# Patient Record
Sex: FEMALE | Race: BLACK OR AFRICAN AMERICAN | NOT HISPANIC OR LATINO | Employment: FULL TIME | URBAN - METROPOLITAN AREA
[De-identification: names, ages, dates, MRNs, and addresses within clinical notes are randomized per-mention and may not be internally consistent; named-entity substitution may affect disease eponyms.]

---

## 2017-03-14 ENCOUNTER — HOSPITAL ENCOUNTER (EMERGENCY)
Facility: HOSPITAL | Age: 37
Discharge: HOME | End: 2017-03-15
Attending: EMERGENCY MEDICINE
Payer: MEDICAID

## 2017-03-14 VITALS
HEART RATE: 68 BPM | OXYGEN SATURATION: 99 % | HEIGHT: 63 IN | SYSTOLIC BLOOD PRESSURE: 146 MMHG | BODY MASS INDEX: 33.49 KG/M2 | TEMPERATURE: 98 F | DIASTOLIC BLOOD PRESSURE: 78 MMHG | RESPIRATION RATE: 18 BRPM | WEIGHT: 189 LBS
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
## 2017-03-14 DIAGNOSIS — S82.892A ANKLE FRACTURE, LEFT, CLOSED, INITIAL ENCOUNTER: Primary | ICD-10-CM

## 2017-03-14 DIAGNOSIS — T14.8XXA FRACTURE: ICD-10-CM

## 2017-03-14 PROCEDURE — 99283 EMERGENCY DEPT VISIT LOW MDM: CPT | Mod: 25

## 2017-03-14 PROCEDURE — 96372 THER/PROPH/DIAG INJ SC/IM: CPT

## 2017-03-14 PROCEDURE — 25000003 PHARM REV CODE 250: Performed by: EMERGENCY MEDICINE

## 2017-03-14 PROCEDURE — 27840 TREAT ANKLE DISLOCATION: CPT

## 2017-03-14 PROCEDURE — 63600175 PHARM REV CODE 636 W HCPCS: Performed by: EMERGENCY MEDICINE

## 2017-03-14 RX ORDER — HYDROCODONE BITARTRATE AND ACETAMINOPHEN 5; 325 MG/1; MG/1
1 TABLET ORAL EVERY 6 HOURS PRN
Qty: 20 TABLET | Refills: 0 | Status: SHIPPED | OUTPATIENT
Start: 2017-03-14 | End: 2018-04-17

## 2017-03-14 RX ORDER — MIDAZOLAM HYDROCHLORIDE 1 MG/ML
2 INJECTION, SOLUTION INTRAMUSCULAR; INTRAVENOUS
Status: COMPLETED | OUTPATIENT
Start: 2017-03-14 | End: 2017-03-14

## 2017-03-14 RX ORDER — HYDROCODONE BITARTRATE AND ACETAMINOPHEN 10; 325 MG/1; MG/1
1 TABLET ORAL
Status: COMPLETED | OUTPATIENT
Start: 2017-03-14 | End: 2017-03-14

## 2017-03-14 RX ORDER — PAROXETINE HYDROCHLORIDE 20 MG/1
20 TABLET, FILM COATED ORAL EVERY MORNING
COMMUNITY
End: 2018-04-17

## 2017-03-14 RX ORDER — MORPHINE SULFATE 10 MG/ML
10 INJECTION INTRAMUSCULAR; INTRAVENOUS; SUBCUTANEOUS
Status: COMPLETED | OUTPATIENT
Start: 2017-03-14 | End: 2017-03-14

## 2017-03-14 RX ADMIN — MIDAZOLAM HYDROCHLORIDE 2 MG: 1 INJECTION, SOLUTION INTRAMUSCULAR; INTRAVENOUS at 10:03

## 2017-03-14 RX ADMIN — HYDROCODONE BITARTRATE AND ACETAMINOPHEN 1 TABLET: 10; 325 TABLET ORAL at 08:03

## 2017-03-14 RX ADMIN — MORPHINE SULFATE 10 MG: 10 INJECTION INTRAVENOUS at 10:03

## 2017-03-15 NOTE — ED NOTES
Patient given d/c instructions and scripts, stated understanding.  Provided with crutches and given crutch training, able to ambulate with crutches.  Patient ambulated out of room, ride at bedside to take patient home.

## 2017-03-15 NOTE — ED NOTES
Patient resting quietly, NAD noted, respirations even/unlabored.  Patient aware of plan of care and all questions answered, will continue to closely monitor.

## 2017-03-15 NOTE — DISCHARGE INSTRUCTIONS
Ankle Fracture    You have an ankle fracture. This means that one or more of the bones that make up the ankle joint are broken. This causes pain, swelling, and sometimes bruising.  A fracture is treated with a splint or cast or special boot. It will take about 4 to 6 weeks for the fracture to heal. Surgery may be needed to fix severe injuries.  Home care  · You will be given a splint, cast or boot to prevent movement at the ankle joint. Unless you were told otherwise, use crutches or a walker. Dont weight on the injured leg until cleared by your healthcare provider to do so. Crutches and walkers can be rented at many pharmacies and surgical or orthopedic supply stores. Dont put weight on a splint. It will break.  · Keep your leg elevated to reduce pain and swelling. When sleeping, place a pillow under the injured leg. When sitting, support the injured leg so it is level with your waist. This is very important during the first 48 hours.  · Apply an ice pack over the injured area for no more than 15 to 20 minutes. Do this every 3 to 6 hours for the first 24 to 48 hours. Continue with ice packs 3 to 4 times a day for the next 2 days, then as needed to ease pain and swelling. To make an ice pack, put ice cubes in a plastic bag that seals at the top. Wrap the bag in a clean, thin towel or cloth. Never put ice or an ice pack directly on the skin. You can place the ice pack directly over the cast or splint. As the ice melts, be careful that the cast or splint doesnt get wet.  · Keep the cast, splint, or boot completely dry at all times. Bathe with your cast, splint, or boot out of the water, protected with 2 large plastic bags. Place 1 bag outside of the other. Tape each bag with duct tape at the top end. Water can still leak in. So it's best to keep the cast, splint, or boot away from water. If a boot or fiberglass cast or splint gets wet, dry it with a hair dryer on a cool setting.  · You may use over-the-counter  pain medicine to control pain, unless another pain medicine was prescribed. Talk with your provider beforeusing these medicines if you have chronic liver or kidney disease or ever had a stomach ulcer or GI bleeding.  Follow-up care  Follow up with your healthcare provider in 1 week, or as advised. This is to be sure the bone is healing properly. If you were given a splint, it may be changed to a cast at your follow-up visit.  If X-rays were taken, you will be told of any new findings that may affect your care.  When to seek medical advice  Call your healthcare provider right away if any of these occur:  · The plaster cast or splint becomes wet or soft  · The fiberglass cast or splint stays wet for more than 24 hours  · There is increased tightness, sore areas, or pain under the cast or splint  · Your toes become swollen, cold, blue, numb, or tingly  · The cast becomes loose  · The cast has a bad smell  · The cast develops cracks or breaks   Date Last Reviewed: 11/23/2015 © 2000-2016 [employer]. [address] [place] [address]. All rights reserved. This information is not intended as a substitute for professional medical care. Always follow your healthcare professional's instructions.          Treating Ankle Fractures  Treatment of an ankle fracture may be surgical or non-surgical, depending on where and how badly your ankle has been broken.   Some stable ankle fractures may be treated in a walking boot. These fractures are stable and will heal without additional treatment. You may be able to start walking on your ankle as soon as the pain improves.  Some fractures may require cast treatment.  A cast may be used to hold the broken bone in its proper position for healing. Sometimes the sections of broken bone must first be realigned. This is done by a process known as reduction. The type of reduction is based on how far the bone has moved from its normal position.     Sites of common ankle  fractures    Closed reduction  If you have a clean break with little soft tissue damage, closed reduction will probably be used. Before the procedure, you may be given a light anesthetic to relax your muscles. Then your doctor manually readjusts the position of the broken bone.  Open reduction  If you have an open fracture (bone sticking out through the skin), badly misaligned sections of bone, or severe tissue injury, open reduction is likely. A general anesthetic may be used during the procedure to let you sleep and relax your muscles. Your doctor then makes one or more incisions to realign the bone and repair soft tissue. Screws or plates may be used to hold the bone in place during healing.    Casting the fracture  To make sure the bone is aligned properly, an X-ray is taken. Then the ankle is put in a cast to hold the bone in place during healing. Youll probably have to wear the cast for several weeks. For less severe fractures, a walking boot, brace, or splint may be all thats needed to hold the bone in place during healing.  The road to healing  Once your fracture has been treated, your doctor will tell you how to help it heal. You may be told to limit ankle use or weight-bearing activities, take medicines, and elevate the foot. If you have a cast, remember to keep it dry.   Date Last Reviewed: 9/9/2015  © 4759-0094 [employer]. [address] [place] [address], [place] [address]. All rights reserved. This information is not intended as a substitute for professional medical care. Always follow your healthcare professional's instructions.

## 2017-03-15 NOTE — ED PROVIDER NOTES
Encounter Date: 3/14/2017       History     Chief Complaint   Patient presents with    Ankle Pain     Left ankle pain after falling off of a hoverboard.  (+) swelling, (+) distal pulses and cap refill     Review of patient's allergies indicates:  No Known Allergies  HPI Comments: [place] and injured her left ankle.  Complaining of severe pain to the left ankle also state that she can't walk on it    Patient is a 36 y.o. female presenting with the following complaint: leg pain. The history is provided by the patient.   Leg Pain    The incident occurred at home. The injury mechanism was a fall. The incident occurred just prior to arrival. The pain is present in the left ankle. The quality of the pain is described as aching, sharp and throbbing. The pain is at a severity of 8/10. The pain has been constant since onset. Associated symptoms include inability to bear weight. Pertinent negatives include no numbness, no loss of motion, no muscle weakness, no loss of sensation and no tingling. She reports no foreign bodies present. The symptoms are aggravated by bearing weight. She has tried nothing for the symptoms.     Past Medical History:   Diagnosis Date    Depression      History reviewed. No pertinent surgical history.  History reviewed. No pertinent family history.  Social History   Substance Use Topics    Smoking status: Never Smoker    Smokeless tobacco: None    Alcohol use No     Review of Systems   Musculoskeletal: Positive for arthralgias.   Neurological: Negative for tingling and numbness.   All other systems reviewed and are negative.      Physical Exam   Initial Vitals   BP Pulse Resp Temp SpO2   03/14/17 2025 03/14/17 2025 03/14/17 2025 03/14/17 2025 03/14/17 2025   159/93 65 18 97.6 °F (36.4 °C) 99 %     Physical Exam    Nursing note and vitals reviewed.  Constitutional: She appears well-developed and well-nourished.   HENT:   Head:  Normocephalic and atraumatic.   Eyes: EOM are normal.   Neck: Normal range of motion. Neck supple.   Cardiovascular: Normal rate, regular rhythm, normal heart sounds and intact distal pulses.   Pulmonary/Chest: Breath sounds normal.   Abdominal: Soft.   Musculoskeletal:        Left ankle: She exhibits decreased range of motion and swelling. She exhibits no ecchymosis, no deformity, no laceration and normal pulse. Tenderness. Lateral malleolus and medial malleolus tenderness found. Achilles tendon exhibits pain. Achilles tendon exhibits no defect.   Neurological: She is alert and oriented to person, place, and time.   Skin: Skin is warm and dry.   Psychiatric: She has a normal mood and affect. Her behavior is normal. Judgment and thought content normal.         ED Course   Procedures  Labs Reviewed - No data to display       X-Rays:   Independently Interpreted Readings:   Other Readings:  Patient has a fracture of the lateral malleolus, small avulsion of the medial malleolus posteriorly, and a dislocation of the tibial talar joint.  Postreduction films reveal adequate reduction of the displaced tibial talar joint    Medical Decision Making:   Clinical Tests:   Radiological Study: Ordered and Reviewed                   ED Course     Clinical Impression:   The primary encounter diagnosis was Ankle fracture, left, closed, initial encounter. Diagnoses of Fall and Fracture were also pertinent to this visit.    Disposition:   Disposition: Discharged  Condition: Stable       Caron Villalta MD  03/14/17 [id number]

## 2017-06-27 DIAGNOSIS — S82.65XD CLOSED NONDISPLACED FRACTURE OF LATERAL MALLEOLUS OF LEFT FIBULA WITH ROUTINE HEALING: Primary | ICD-10-CM

## 2017-07-19 ENCOUNTER — CLINICAL SUPPORT (OUTPATIENT)
Dept: REHABILITATION | Facility: HOSPITAL | Age: 37
End: 2017-07-19
Attending: ORTHOPAEDIC SURGERY
Payer: MEDICAID

## 2017-07-19 DIAGNOSIS — M79.605 PAIN OF LEFT LOWER EXTREMITY: ICD-10-CM

## 2017-07-19 PROCEDURE — 97110 THERAPEUTIC EXERCISES: CPT

## 2017-07-19 PROCEDURE — 97161 PT EVAL LOW COMPLEX 20 MIN: CPT

## 2017-07-19 NOTE — PROGRESS NOTES
"[facility]   PATIENT EVALUATION    Date: 07/19/2017    Visit #: 1    Start Time:  1105  Stop Time:  1200        History   HPI: Patient reports injuring her L lower leg while stepping off a giraldo board on March 13, 2017. She reports having a fibular fracture fx with a splint and states being on bed rest. Then, the last 5 weeks she has been in a boot. Patient states the MD reported her fx is healed and she has been cleared to wean from boot. Her ankle ROM and standing endurance is most limited.   Onset Date: March 13, 2017  Date of Surgery:April 4, 2017  Primary Diagnosis:   1. Pain of left lower extremity       Treatment Diagnosis: s/p L fibular fx with secondary ankle joint tightness  Past Medical History:   Diagnosis Date    Depression      Precautions: standard  Prior Therapy: none  Diagnostic Tests: x-ray  Prior Level of Function: Independent  Sports/Recreational Activities: working out  Extremity Dominance: right  Functional Deficits Leading to Referral/Nature of Injury: none  Patient Therapy Goals: return to PLOF    Subjective     Kimmie Shmuel states she is feeling fine today.    Pain:  Location: ankles  Description: Sharp and cramping  Activities Which Increase Pain: nothing  Activities Which Decrease Pain: "green acholol"  Pain Scale: 0/10 at best 3/10 now  3/10 at worst    Objective     Posture: increased L knee flexion secondary to boot  Palpation: TTP around incision site  Sensation: intact to light touch  DTRs: NT  Range of Motion/Strength:         Range of motion          Ankle Left Right   DF 4 10   PF 45 70   Inversion 15 20   Eversion 12 22                 Strength:    Knee:        Left Right   Extension      Flexion                     Ankle Left Right   DF 4/5 5/5   PF 2/5 5/5   Inversion 4/5 5/5   Eversion 4/5 5/5             Balance/ Functional Tests   Left Right   SLS eyes open 6 sec 60 sec         Treatment:   Ankle pumps x30  Ankle circles x30 cw and " ccw  Abc's   SL balance eyes open    PT reviewed FOTO scores for Kimmie Mi on 7/19/17  FOTO score: 72    Functional Limitations Reports - G Codes  Category: mobility  Tool: FOTO      Current ():  CJ  Goal (): CI  Discharge ():  NA          History  Co-morbidities and personal factors that may impact the plan of care Low    Stable Clinical Presentation   Co-morbidities:       Personal Factors:     Body regions    Body systems    Activity Limitations    Participation Restrictons   No personal factors and/ or  comorbidites          Address 1-2 elements:     ROM impaired  MMT impaired  Gait impaired  Decreased FOTO score                                 Assessment   This is a 36 y.o. female referred to outpatient physical therapy and presents with a medical diagnosis of s/p L fibular fx and demonstrates limitations as described in the problem list. Pt demonstrates good rehab potential. Pt will benefit from physcial therapy services in order to maximize pain free and/or functional use of left ankle. The following goals were discussed with the patient and patient is in agreement with them as to be addressed in the treatment plan. Pt was given a HEP consisting of treatment this visit. Pt verbally understood the instructions as they were given and demonstrated proper form and technique during therapy. Pt was advised to perform these exercises free of pain, and to stop performing them if pain occurs.         Initial Assessment (Pertinent finding, problem list and factors affecting outcome):   Medical necessity is demonstrated by the following problem list:   - Pain limits function of effected part for all activities  - Unable to participate in daily activities     Rehab Potiential: good    Short Term Goals (4 Weeks):   - Pt will increase ROM to 10 deg L DF and 55 deg L PF  - Pt will increase strength to 4/5 L PF, 5/5 L DF, IN, Ev  - Decrease Pain to 2/10 as worst  - Pt to self correct posture with minimal  cues  - Pt independent with HEP with progressions.     Long Term Goals (8-10 Weeks):   - Pt will increase ROM to WNL L ankle  - Pt will increase strength to 5/5 L ankle  - Decrease Pain to 0/10  - Pt to return to 95% PLOF  - Pt will balance SL with eyes open for 60 secs      Plan     Pt will be seen 1-2 times per week for 8-10 weeks. Recommended Treatment Plan will include:   AROM/PROM exercise  Manual soft tissue and/or joint mobilization  Therapeutic Exercise   Individualized Home Exercise Program  Modalities:cryotherapy   Pt education:on HEP    Therapist: Carmen Ogden, PT, DPT  I CERTIFY THE NEED FOR THESE SERVICES FURNISHED UNDER THIS PLAN OF TREATMENT AND WHILE UNDER MY CARE    Physician's comments: ________________________________________________________________________________________________________________________________________________    Physician's Name: ___________________________________

## 2017-07-26 ENCOUNTER — CLINICAL SUPPORT (OUTPATIENT)
Dept: REHABILITATION | Facility: HOSPITAL | Age: 37
End: 2017-07-26
Attending: ORTHOPAEDIC SURGERY
Payer: MEDICAID

## 2017-07-26 DIAGNOSIS — M79.605 PAIN OF LEFT LOWER EXTREMITY: ICD-10-CM

## 2017-07-26 PROCEDURE — 97110 THERAPEUTIC EXERCISES: CPT

## 2017-07-26 PROCEDURE — 97140 MANUAL THERAPY 1/> REGIONS: CPT

## 2017-07-26 NOTE — PROGRESS NOTES
Physical Therapy Daily Note     Date: 07/26/2017  Name: Kimmie Mi  Clinic Number: [id number]  Diagnosis:   Encounter Diagnosis   Name Primary?    Pain of left lower extremity      Physician: Earnest Cha, *    Evaluation Date: 7/19/17  Visit #: 2   Start Time:  1110  Stop Time:  1205  Total Treatment Time: 55    Precautions: standard, s/p L fib fx      Subjective     Pt reports compliance with HEP.    Pain: 0/10    Objective     Measurements taken:       Patient received group therapy to increase strength, endurance, ROM and flexibility with activities as follows:     Kimmie received therapeutic exercises to develop strength, endurance, ROM and flexibility for 30 minutes including:   Stat bike x10 min  Ankle pumps x30  Ankle circles x30 cw and ccw  Abc's -np  SL balance eyes open  Fitter board 4 ways x30 ea  [place]  x1  PF/DF ytb x30  Towel curls x2 min    Treatment ended with ice x10 min      Kimmie received the following manual therapy techniques: Joint mobilizations were applied to the: L ankle for 10 minutes.       Written Home Exercises Provided: provided at initial eval  Pt demo good understanding of the education provided. Kimmie demonstrated good return demonstration of activities.     Education provided:  Kimmie verbalized good understanding of education provided.   No spiritual or educational barriers to learning identified.    Assessment     This is a 36 y.o. female referred to outpatient physical therapy and presents with a medical diagnosis of s/p L fib fx and demonstrates limitations as described in the problem list Pt prognosis is Good. Pt will continue to benefit from skilled outpatient physical therapy to address the deficits listed below in the problem list, provide pt/family education and to maximize pt's level of independence in the home and community environment.    Will the patient continue to benefit from skilled PT  intervention? yes        Medical necessity is demonstrated by:   - Pain limits function of effected part for all activities  - Unable to participate in daily activities     Progress towards goals: good    New/Revised Goals:none this visit       Plan   Continue with established Plan of Care towards PT goals.      Therapist: Carmen Ogden, PT, DPT

## 2017-07-28 ENCOUNTER — CLINICAL SUPPORT (OUTPATIENT)
Dept: REHABILITATION | Facility: HOSPITAL | Age: 37
End: 2017-07-28
Attending: ORTHOPAEDIC SURGERY
Payer: MEDICAID

## 2017-07-28 DIAGNOSIS — M79.605 PAIN OF LEFT LOWER EXTREMITY: ICD-10-CM

## 2017-07-28 PROCEDURE — 97140 MANUAL THERAPY 1/> REGIONS: CPT

## 2017-07-28 PROCEDURE — 97110 THERAPEUTIC EXERCISES: CPT

## 2017-07-28 NOTE — PROGRESS NOTES
Physical Therapy Daily Note     Date: 07/28/2017  Name: Kimmie Mi  Clinic Number: [id number]  Diagnosis:   Encounter Diagnosis   Name Primary?    Pain of left lower extremity      Physician: Earnest Cha, *    Evaluation Date: 7/19/17  Visit #: 3   Start Time:  355  Stop Time:  445  Total Treatment Time: 50    Precautions: standard, s/p L fib fx      Subjective     Pt reports noticing slight improvement with L ankle ROM.    Pain: 0/10    Objective     Measurements taken:       Patient received group therapy to increase strength, endurance, ROM and flexibility with activities as follows:     Kimmie received therapeutic exercises to develop strength, endurance, ROM and flexibility for 30 minutes including:   Stat bike x10 min  Ankle pumps x30-np  Ankle circles x30 cw and ccw-np  SL balance eyes open-np  Fitter board 4 ways x30 ea  [place]  x1  PF/DF ytb x30  MR iso IN/EV 5 x 10sh  Towel curls x2 min    Treatment ended with ice x10 min      Kimmie received the following manual therapy techniques: Joint mobilizations were applied to the: L ankle for 10 minutes.       Written Home Exercises Provided: provided at initial eval  Pt demo good understanding of the education provided. Kimmie demonstrated good return demonstration of activities.     Education provided:  Kimmie verbalized good understanding of education provided.   No spiritual or educational barriers to learning identified.    Assessment   Patient showing slight improvement with ROM during fitter board exercises and improved technique/coordination with increased repetitions. Patient continues to compensation inversion and eversion with hip rotation, but is responding well to isometric strengthening with tactile cuing. Patient would continue to benefit from PT intervention to progress toward functional goals.   This is a 36 y.o. female referred to outpatient physical therapy and presents with a  medical diagnosis of s/p L fib fx and demonstrates limitations as described in the problem list Pt prognosis is Good. Pt will continue to benefit from skilled outpatient physical therapy to address the deficits listed below in the problem list, provide pt/family education and to maximize pt's level of independence in the home and community environment.    Will the patient continue to benefit from skilled PT intervention? yes        Medical necessity is demonstrated by:   - Pain limits function of effected part for all activities  - Unable to participate in daily activities     Progress towards goals: good    New/Revised Goals:none this visit       Plan   Continue with established Plan of Care towards PT goals.      Therapist: Carmen Ogden, PT, DPT

## 2017-08-04 ENCOUNTER — CLINICAL SUPPORT (OUTPATIENT)
Dept: REHABILITATION | Facility: HOSPITAL | Age: 37
End: 2017-08-04
Attending: ORTHOPAEDIC SURGERY
Payer: MEDICAID

## 2017-08-04 DIAGNOSIS — M79.605 PAIN OF LEFT LOWER EXTREMITY: ICD-10-CM

## 2017-08-04 PROCEDURE — 97140 MANUAL THERAPY 1/> REGIONS: CPT

## 2017-08-04 PROCEDURE — 97110 THERAPEUTIC EXERCISES: CPT

## 2017-08-04 NOTE — PROGRESS NOTES
Physical Therapy Daily Note     Date: 08/04/2017  Name: Kimmie Mi  Clinic Number: [id number]  Diagnosis:   Encounter Diagnosis   Name Primary?    Pain of left lower extremity      Physician: Earnest Cha, *    Evaluation Date: 7/19/17  Visit #: 4  Start Time:  335  Stop Time:  430  Total Treatment Time: 55    Precautions: standard, s/p L fib fx      Subjective     Pt reports she was sore/stiff from wearing her normal shoes today.    Pain: 0/10    Objective     Measurements taken:       Patient received group therapy to increase strength, endurance, ROM and flexibility with activities as follows:     Kimmie received therapeutic exercises to develop strength, endurance, ROM and flexibility for 35 minutes including:   Stat bike x10 min  SL balance eyes open-np  Fitter board 4 ways x30 ea  [place]  x1  PF/DF otb x30  In/Ev ytb x30  MR iso IN/EV 5 x 10sh-np  Towel curls x2 min-np  Toe walking with stick x1 lap  Heel walking x1 lap    Treatment ended with ice x10 min      Kimmie received the following manual therapy techniques: Joint mobilizations and STM were applied to the: L ankle for 10 minutes.       Written Home Exercises Provided: provided at initial eval  Pt demo good understanding of the education provided. Kimmie demonstrated good return demonstration of activities.     Education provided:  Kimmie verbalized good understanding of education provided.   No spiritual or educational barriers to learning identified.    Assessment   Patient showed much improved Inversion and eversion this visit with less compensation. Patient also tolerated toe walking well for first attempt. Patient would continue to benefit from PT intervention to progress toward functional goals.   This is a 36 y.o. female referred to outpatient physical therapy and presents with a medical diagnosis of s/p L fib fx and demonstrates limitations as described in the problem list Pt  prognosis is Good. Pt will continue to benefit from skilled outpatient physical therapy to address the deficits listed below in the problem list, provide pt/family education and to maximize pt's level of independence in the home and community environment.    Will the patient continue to benefit from skilled PT intervention? yes        Medical necessity is demonstrated by:   - Pain limits function of effected part for all activities  - Unable to participate in daily activities     Progress towards goals: good    New/Revised Goals:none this visit       Plan   Continue with established Plan of Care towards PT goals.      Therapist: Carmen Ogden, PT, DPT

## 2017-08-09 ENCOUNTER — CLINICAL SUPPORT (OUTPATIENT)
Dept: REHABILITATION | Facility: HOSPITAL | Age: 37
End: 2017-08-09
Attending: ORTHOPAEDIC SURGERY
Payer: MEDICAID

## 2017-08-09 DIAGNOSIS — M79.605 PAIN OF LEFT LOWER EXTREMITY: Primary | ICD-10-CM

## 2017-08-09 PROCEDURE — 97140 MANUAL THERAPY 1/> REGIONS: CPT

## 2017-08-09 PROCEDURE — 97110 THERAPEUTIC EXERCISES: CPT

## 2017-08-09 NOTE — PROGRESS NOTES
Physical Therapy Daily Note     Date: 08/09/2017  Name: Kimmie Mi  Clinic Number: [id number]  Diagnosis:   Encounter Diagnosis   Name Primary?    Pain of left lower extremity Yes     Physician: Earnest Cha, *    Evaluation Date: 7/19/17  Visit #: 5  Start Time:  11:12  Stop Time:  430  Total Treatment Time: 55    Precautions: standard, s/p L fib fx      Subjective     Pt reports she was sore/stiff from wearing her normal shoes today.    Pain: 0/10    Objective     Measurements taken:       Patient received group therapy to increase strength, endurance, ROM and flexibility with activities as follows:     Kimmie received therapeutic exercises to develop strength, endurance, ROM and flexibility for 35 minutes including:   Stat bike x10 min  SL balance eyes open-np  Fitter board 4 ways x30 ea  [place]  x1  PF/DF otb x30  In/Ev otb x30  MR iso IN/EV 5 x 10sh-np  Towel curls x2 min-np  Toe walking with stick x1 lap  Heel walking x1 lap    Treatment ended with ice x10 min      Kimmie received the following manual therapy techniques: Joint mobilizations and STM were applied to the: L ankle for 10 minutes.       Written Home Exercises Provided: provided at initial eval  Pt demo good understanding of the education provided. Kimmie demonstrated good return demonstration of activities.     Education provided:  Kimmie verbalized good understanding of education provided.   No spiritual or educational barriers to learning identified.    Assessment   Patient continues to show improved Inversion and eversion this visit with less compensation. Patient also tolerated toe walking well for first attempt. Patient would continue to benefit from PT intervention to progress toward functional goals.   This is a 36 y.o. female referred to outpatient physical therapy and presents with a medical diagnosis of s/p L fib fx and demonstrates limitations as described in the problem  list Pt prognosis is Good. Pt will continue to benefit from skilled outpatient physical therapy to address the deficits listed below in the problem list, provide pt/family education and to maximize pt's level of independence in the home and community environment.    Will the patient continue to benefit from skilled PT intervention? yes        Medical necessity is demonstrated by:   - Pain limits function of effected part for all activities  - Unable to participate in daily activities     Progress towards goals: good    New/Revised Goals:none this visit       Plan   Continue with established Plan of Care towards PT goals.      Therapist: Ever Lucero PTA,

## 2017-08-11 ENCOUNTER — CLINICAL SUPPORT (OUTPATIENT)
Dept: REHABILITATION | Facility: HOSPITAL | Age: 37
End: 2017-08-11
Attending: ORTHOPAEDIC SURGERY
Payer: MEDICAID

## 2017-08-11 DIAGNOSIS — M79.605 PAIN OF LEFT LOWER EXTREMITY: ICD-10-CM

## 2017-08-11 PROCEDURE — 97110 THERAPEUTIC EXERCISES: CPT

## 2017-08-11 NOTE — PROGRESS NOTES
Physical Therapy Daily Note     Date: 08/11/2017  Name: Kimmie Mi  Clinic Number: [id number]  Diagnosis:   Encounter Diagnosis   Name Primary?    Pain of left lower extremity      Physician: Earnest Cha, *    Evaluation Date: 7/19/17  Visit #: 6  Start Time:  355  Stop Time:  435  Total Treatment Time: 40    Precautions: standard, s/p L fib fx      Subjective     Pt reports she has been working on her walking and balance.    Pain: 0/10    Objective     Measurements taken:       Patient received group therapy to increase strength, endurance, ROM and flexibility with activities as follows:     Kimmie received therapeutic exercises to develop strength, endurance, ROM and flexibility for 40 minutes including:   Stat bike x10 min  SL balance eyes open-np  Fitter board 4 ways x30 ea  [place]  x1  PF/DF otb x30  In/Ev otb x30  MR iso IN/EV 5 x 10sh-np  Towel curls x2 min-np  Toe walking  x1 lap  Heel walking x1 lap  GSS x2 min      Treatment ended with ice x10 min      Kimmie received the following manual therapy techniques: Joint mobilizations and STM were applied to the: L ankle for 0 minutes.       Written Home Exercises Provided: provided at initial eval  Pt demo good understanding of the education provided. Kimmie demonstrated good return demonstration of activities.     Education provided:  Kimmie verbalized good understanding of education provided.   No spiritual or educational barriers to learning identified.    Assessment   Patient demonstrated improved SL balance, toe walking and In/Ev strength this visit. She is progressing well with PT intervention and is showing good motor return of L ankle. Patient would continue to benefit from PT intervention to progress toward functional goals.   This is a 36 y.o. female referred to outpatient physical therapy and presents with a medical diagnosis of s/p L fib fx and demonstrates limitations as described  in the problem list Pt prognosis is Good. Pt will continue to benefit from skilled outpatient physical therapy to address the deficits listed below in the problem list, provide pt/family education and to maximize pt's level of independence in the home and community environment.    Will the patient continue to benefit from skilled PT intervention? yes        Medical necessity is demonstrated by:   - Pain limits function of effected part for all activities  - Unable to participate in daily activities     Progress towards goals: good    New/Revised Goals:none this visit       Plan   Continue with established Plan of Care towards PT goals.      Therapist: Carmen Ogden, PT, DPT

## 2017-08-16 ENCOUNTER — CLINICAL SUPPORT (OUTPATIENT)
Dept: REHABILITATION | Facility: HOSPITAL | Age: 37
End: 2017-08-16
Attending: ORTHOPAEDIC SURGERY
Payer: MEDICAID

## 2017-08-16 DIAGNOSIS — M79.605 PAIN OF LEFT LOWER EXTREMITY: Primary | ICD-10-CM

## 2017-08-16 PROCEDURE — 97110 THERAPEUTIC EXERCISES: CPT

## 2017-08-16 NOTE — PROGRESS NOTES
Physical Therapy Daily Note     Date: 08/16/2017  Name: Kimmie Mi  Clinic Number: [id number]  Diagnosis:   Encounter Diagnosis   Name Primary?    Pain of left lower extremity Yes     Physician: Earnest Cha, *    Evaluation Date: 7/19/17  Visit #: 7  Start Time:  10:55  Stop Time:  12:10  Total Treatment Time: 60    Precautions: standard, s/p L fib fx      Subjective     Pt reports that she sometimes get pn in proximal anterior ankle. Pt is w/o c/o pn at this time.    Pain: 0/10    Objective     Measurements taken:       Patient was instructed in and performed ther ex  to increase strength, endurance, ROM and flexibility, bal with activities as follows:     Kimmie was instructed in and performed  therapeutic exercises to develop strength, endurance, ROM and flexibility, bal for 50 minutes including:   Stat bike x10 min  SL balance eyes open 3 x 30 sec  Fitter board 4 ways x30 ea  [employer] Catch L 12 5 min   [place]  x1  PF/DF gtb x30  In/Ev gtb x30  MR iso IN/EV 5 x 10sh-np  Towel curls x2 min-np  Toe walking  x1 lap  Heel walking x1 lap  GSS x2 min      Treatment ended with ice x10 min      Kimmie received the following manual therapy techniques: Joint mobilizations and STM were applied to the: L ankle for 0 minutes.       Written Home Exercises Provided: provided at initial eval  Pt demo good understanding of the education provided. Kimmie demonstrated good return demonstration of activities.     Education provided:  Kimmie verbalized good understanding of education provided.   No spiritual or educational barriers to learning identified.    Assessment   Patient demonstrated improved SL balance, toe walking and In/Ev strength this visit. She is progressing well with PT intervention and is showing good motor return of L ankle. Patient would continue to benefit from PT intervention to progress toward functional goals.   This is a 36 y.o. female referred  to outpatient physical therapy and presents with a medical diagnosis of s/p L fib fx and demonstrates limitations as described in the problem list Pt prognosis is Good. Pt will continue to benefit from skilled outpatient physical therapy to address the deficits listed below in the problem list, provide pt/family education and to maximize pt's level of independence in the home and community environment.    Will the patient continue to benefit from skilled PT intervention? yes        Medical necessity is demonstrated by:   - Pain limits function of effected part for all activities  - Unable to participate in daily activities     Progress towards goals: good    New/Revised Goals:none this visit       Plan   Continue with established Plan of Care towards PT goals.      Therapist: Ever Lucero PTA,

## 2018-04-17 ENCOUNTER — HOSPITAL ENCOUNTER (EMERGENCY)
Facility: HOSPITAL | Age: 38
Discharge: HOME | End: 2018-04-17
Attending: EMERGENCY MEDICINE
Payer: MEDICAID

## 2018-04-17 VITALS
TEMPERATURE: 98 F | RESPIRATION RATE: 18 BRPM | WEIGHT: 167 LBS | DIASTOLIC BLOOD PRESSURE: 67 MMHG | HEART RATE: 79 BPM | OXYGEN SATURATION: 95 % | SYSTOLIC BLOOD PRESSURE: 123 MMHG | BODY MASS INDEX: 28.51 KG/M2 | HEIGHT: 64 IN

## 2018-04-17 DIAGNOSIS — L02.31 ABSCESS AND CELLULITIS OF GLUTEAL REGION: Primary | ICD-10-CM

## 2018-04-17 DIAGNOSIS — L03.317 ABSCESS AND CELLULITIS OF GLUTEAL REGION: Primary | ICD-10-CM

## 2018-04-17 PROCEDURE — 99283 EMERGENCY DEPT VISIT LOW MDM: CPT

## 2018-04-17 RX ORDER — CLINDAMYCIN HYDROCHLORIDE 150 MG/1
300 CAPSULE ORAL 4 TIMES DAILY
Qty: 56 CAPSULE | Refills: 0 | Status: SHIPPED | OUTPATIENT
Start: 2018-04-17 | End: 2018-04-24

## 2018-04-17 NOTE — ED NOTES
Abscess to inner left buttock at gluteal fold x 1 week that worsened over the last 2 days.  Denies fever or drainage from site.  Red, swollen and tender to touch.

## 2018-04-17 NOTE — ED PROVIDER NOTES
Encounter Date: 4/17/2018       History     Chief Complaint   Patient presents with    Abscess     c/o abscess to left buttock     37-year-old female presents complaining of abscess to left buttock ×5 days.  Patient states it has continually gotten harder and more painful.  She has been applying warm compresses with witch hazel and taking ibuprofen 800mg for pain with some relief.  States that it has drained a few drops of blood since doing the compresses.  Patient states she has had a similar episode which resulted in cyst removal from her upper back.  Denies fever.       The history is provided by the patient. No  was used.     Review of patient's allergies indicates:  No Known Allergies  Past Medical History:   Diagnosis Date    Depression      Past Surgical History:   Procedure Laterality Date    ANKLE SURGERY Left      No family history on file.  Social History   Substance Use Topics    Smoking status: Never Smoker    Smokeless tobacco: Not on file    Alcohol use No     Review of Systems   Constitutional: Negative for chills and fever.   HENT: Negative for sore throat.    Respiratory: Negative for shortness of breath.    Cardiovascular: Negative for chest pain.   Gastrointestinal: Negative for nausea.   Genitourinary: Negative for dysuria.   Musculoskeletal: Negative for back pain.   Skin: Negative for rash.   Neurological: Negative for weakness.   Hematological: Does not bruise/bleed easily.   Psychiatric/Behavioral: Negative for confusion.       Physical Exam     Initial Vitals [04/17/18 1258]   BP Pulse Resp Temp SpO2   123/67 79 18 97.6 °F (36.4 °C) 95 %      MAP       85.67         Physical Exam    Nursing note and vitals reviewed.  Constitutional: Vital signs are normal. She appears well-developed and well-nourished. No distress.   HENT:   Head: Normocephalic and atraumatic.   Nose: Nose normal.   Eyes: Conjunctivae and lids are normal.   Neck: Normal range of motion and  phonation normal.   Cardiovascular: Normal rate, regular rhythm and normal heart sounds.   Pulmonary/Chest: Effort normal and breath sounds normal.   Abdominal: Soft. Normal appearance and bowel sounds are normal.   Musculoskeletal: Normal range of motion.   Neurological: She is alert and oriented to person, place, and time.   Skin: Skin is warm and dry. Abscess noted.        Psychiatric: She has a normal mood and affect. Her speech is normal and behavior is normal. Judgment and thought content normal. Cognition and memory are normal.         ED Course   Procedures  Labs Reviewed - No data to display          Medical Decision Making:   Initial Assessment:   37-year-old female presents complaining of abscess to left buttock ×5 days.  Patient states it has continually gotten harder and more painful.  She has been applying warm compresses with witch hazel and taking ibuprofen 800mg for pain with some relief.  States that it has drained a few drops of blood since doing the compresses.  Patient states she has had a similar episode which resulted in cyst removal from her upper back.  Denies fever.   Physical exam reveals presently 6 cm of induration with central opening, not draining, TTP.  Positive for erythema and warmth.  Negative for fluctuance.   Incision and drainage is not indicated as no area of fluctuance is found and area is already open.  Differential Diagnosis:   Abscess versus cellulitis  ED Management:  Patient will be discharged with prescription for clindamycin ×7 days.  Advised to continue warm compresses and that wound may drain bloody or purulent material.  Instructed to follow-up with her primary care doctor in 2-3 days.  Advised to return to ED if symptoms worsen or she develops fever.  Patient seen by and discussed with Dr. Shaver who agrees.              Attending Attestation:     Physician Attestation Statement for NP/PA:   I have conducted a face to face encounter with this patient in addition to  the NP/PA, due to NP/PA Request    Other NP/PA Attestation Additions:    History of Present Illness: 37-year-old female who complains of a left buttock abscess.  No fever or chills.   Physical Exam: Left buttock with approximately 6 cm circular area of erythema and induration.  No fluctuance.                     Clinical Impression:   The encounter diagnosis was Abscess and cellulitis of gluteal region.                           ANAND ParryC  04/17/18 8809       Raleigh Shaver MD  04/17/18 2190

## 2019-02-28 ENCOUNTER — HOSPITAL ENCOUNTER (EMERGENCY)
Facility: HOSPITAL | Age: 39
Discharge: HOME | End: 2019-02-28
Attending: EMERGENCY MEDICINE
Payer: MEDICAID

## 2019-02-28 VITALS
OXYGEN SATURATION: 99 % | WEIGHT: 156 LBS | HEART RATE: 68 BPM | SYSTOLIC BLOOD PRESSURE: 128 MMHG | HEIGHT: 64 IN | BODY MASS INDEX: 26.63 KG/M2 | RESPIRATION RATE: 18 BRPM | DIASTOLIC BLOOD PRESSURE: 78 MMHG | TEMPERATURE: 98 F

## 2019-02-28 DIAGNOSIS — N93.9 VAGINAL BLEEDING: ICD-10-CM

## 2019-02-28 DIAGNOSIS — N93.8 DUB (DYSFUNCTIONAL UTERINE BLEEDING): Primary | ICD-10-CM

## 2019-02-28 DIAGNOSIS — R53.83 FATIGUE, UNSPECIFIED TYPE: ICD-10-CM

## 2019-02-28 LAB
ALBUMIN SERPL BCP-MCNC: 3.9 G/DL
ALP SERPL-CCNC: 72 U/L
ALT SERPL W/O P-5'-P-CCNC: 18 U/L
ANION GAP SERPL CALC-SCNC: 8 MMOL/L
AST SERPL-CCNC: 19 U/L
B-HCG UR QL: NEGATIVE
BASOPHILS # BLD AUTO: 0.01 K/UL
BASOPHILS NFR BLD: 0.1 %
BILIRUB SERPL-MCNC: 0.6 MG/DL
BILIRUB UR QL STRIP: NEGATIVE
BUN SERPL-MCNC: 9 MG/DL
CALCIUM SERPL-MCNC: 9.5 MG/DL
CHLORIDE SERPL-SCNC: 104 MMOL/L
CLARITY UR: CLEAR
CO2 SERPL-SCNC: 27 MMOL/L
COLOR UR: YELLOW
CREAT SERPL-MCNC: 0.7 MG/DL
CTP QC/QA: YES
DIFFERENTIAL METHOD: ABNORMAL
EOSINOPHIL # BLD AUTO: 0 K/UL
EOSINOPHIL NFR BLD: 0.5 %
ERYTHROCYTE [DISTWIDTH] IN BLOOD BY AUTOMATED COUNT: 15.1 %
EST. GFR  (AFRICAN AMERICAN): >60 ML/MIN/1.73 M^2
EST. GFR  (NON AFRICAN AMERICAN): >60 ML/MIN/1.73 M^2
GLUCOSE SERPL-MCNC: 111 MG/DL
GLUCOSE UR QL STRIP: NEGATIVE
HCT VFR BLD AUTO: 35.2 %
HGB BLD-MCNC: 11.5 G/DL
HGB UR QL STRIP: ABNORMAL
KETONES UR QL STRIP: NEGATIVE
LEUKOCYTE ESTERASE UR QL STRIP: NEGATIVE
LYMPHOCYTES # BLD AUTO: 2.1 K/UL
LYMPHOCYTES NFR BLD: 25.6 %
MCH RBC QN AUTO: 29 PG
MCHC RBC AUTO-ENTMCNC: 32.7 G/DL
MCV RBC AUTO: 89 FL
MICROSCOPIC COMMENT: ABNORMAL
MONOCYTES # BLD AUTO: 0.7 K/UL
MONOCYTES NFR BLD: 8.6 %
NEUTROPHILS # BLD AUTO: 5.4 K/UL
NEUTROPHILS NFR BLD: 65.1 %
NITRITE UR QL STRIP: NEGATIVE
PH UR STRIP: 8 [PH] (ref 5–8)
PLATELET # BLD AUTO: 370 K/UL
PMV BLD AUTO: 10.3 FL
POTASSIUM SERPL-SCNC: 3.7 MMOL/L
PROT SERPL-MCNC: 7.7 G/DL
PROT UR QL STRIP: NEGATIVE
RBC # BLD AUTO: 3.96 M/UL
RBC #/AREA URNS HPF: 30 /HPF (ref 0–4)
SODIUM SERPL-SCNC: 139 MMOL/L
SP GR UR STRIP: 1.01 (ref 1–1.03)
TSH SERPL DL<=0.005 MIU/L-ACNC: 1 UIU/ML
URN SPEC COLLECT METH UR: ABNORMAL
UROBILINOGEN UR STRIP-ACNC: 1 EU/DL
WBC # BLD AUTO: 8.33 K/UL

## 2019-02-28 PROCEDURE — 81025 URINE PREGNANCY TEST: CPT | Performed by: NURSE PRACTITIONER

## 2019-02-28 PROCEDURE — 84443 ASSAY THYROID STIM HORMONE: CPT

## 2019-02-28 PROCEDURE — 96360 HYDRATION IV INFUSION INIT: CPT

## 2019-02-28 PROCEDURE — 81000 URINALYSIS NONAUTO W/SCOPE: CPT

## 2019-02-28 PROCEDURE — 85025 COMPLETE CBC W/AUTO DIFF WBC: CPT

## 2019-02-28 PROCEDURE — 25000003 PHARM REV CODE 250: Performed by: NURSE PRACTITIONER

## 2019-02-28 PROCEDURE — 80053 COMPREHEN METABOLIC PANEL: CPT

## 2019-02-28 PROCEDURE — 99284 EMERGENCY DEPT VISIT MOD MDM: CPT | Mod: 25

## 2019-02-28 RX ADMIN — SODIUM CHLORIDE 1000 ML: 0.9 INJECTION, SOLUTION INTRAVENOUS at 03:02

## 2019-02-28 NOTE — ED NOTES
Complains of passing clots and having vaginal bleeding since the 15th of this month. Has history of PCOS, sees MD at [facility]. Is currently of metformin   APPEARANCE: Alert, oriented and in no acute distress.  CARDIAC: Normal rate and rhythm, no murmur heard.   PERIPHERAL VASCULAR: peripheral pulses present. Normal cap refill. No edema. Warm to touch.    RESPIRATORY:Normal rate and effort, breath sounds clear bilaterally throughout chest. Respirations are equal and unlabored no obvious signs of distress.  GASTRO: soft, bowel sounds normal, no tenderness, no abdominal distention.  MUSC: Full ROM. No bony tenderness or soft tissue tenderness. No obvious deformity.  SKIN: Skin is warm and dry, normal skin turgor, mucous membranes moist.  NEURO: 5/5 strength major flexors/extensors bilaterally. Sensory intact to light touch bilaterally. Juanjo coma scale: eyes open spontaneously-4, oriented & converses-5, obeys commands-6. No neurological abnormalities.   MENTAL STATUS: awake, alert and aware of environment.  EYE: PERRL, both eyes: pupils brisk and reactive to light. Normal size.  ENT: EARS: no obvious drainage. NOSE: no active bleeding.

## 2019-02-28 NOTE — ED TRIAGE NOTES
Complains of prolonged vaginal bleeding with passing of clots. Denies heavy flow but states she has been low on iron in recent past

## 2019-02-28 NOTE — DISCHARGE INSTRUCTIONS
Increase your water take.  Use prenatal vitamins as directed on the labeling. Return to the ED if your condition changes, progresses, or if you have any concerns.

## 2019-02-28 NOTE — ED PROVIDER NOTES
Encounter Date: 2/28/2019       History     Chief Complaint   Patient presents with    Fatigue     pt reports intermittent vaginal bleeding since 2/15. pt reports headache, generalized weakness, lightheadedness, and chills. denies abd pain, n/v, or dysuria     38-year-old female presents the ED for vaginal bleeding and fatigue.  The patient reports that she has had intermittent vaginal bleeding since 02/15/2019.  She has seen her gynecologist at [facility] and was told that this was due to polycystic ovarian syndrome.  She is currently on metformin as she is trying to get pregnant.  She reports that last month she was given a 10 day course of medication to help with bleeding, but the medication did not work.  She does not recall the name of the medication.  She denies pain, dizziness, chest pain, shortness of breath, fever, and dysuria.  She does report chills and feeling lightheaded.  She is tolerating oral fluids without difficulty. No anticoagulant use.  No other complaints at this time.       The history is provided by the patient.     Review of patient's allergies indicates:  No Known Allergies  Past Medical History:   Diagnosis Date    Depression      Past Surgical History:   Procedure Laterality Date    ANKLE SURGERY Left      No family history on file.  Social History     Tobacco Use    Smoking status: Never Smoker   Substance Use Topics    Alcohol use: No    Drug use: No     Review of Systems   Constitutional: Positive for activity change, chills and fatigue. Negative for fever.   HENT: Negative for congestion.    Respiratory: Negative for cough and shortness of breath.    Cardiovascular: Negative for chest pain.   Gastrointestinal: Negative for abdominal pain, diarrhea, nausea and vomiting.   Genitourinary: Positive for vaginal bleeding. Negative for dysuria.   Musculoskeletal: Negative for back pain.   Skin: Negative for pallor and rash.   Neurological: Negative for weakness and headaches.    Hematological: Does not bruise/bleed easily.   Psychiatric/Behavioral: Negative for confusion.   All other systems reviewed and are negative.      Physical Exam     Initial Vitals [02/28/19 1346]   BP Pulse Resp Temp SpO2   133/68 79 20 98.6 °F (37 °C) 98 %      MAP       --         Physical Exam    Nursing note and vitals reviewed.  Constitutional: Vital signs are normal. She appears well-developed and well-nourished. She is active and cooperative. She is easily aroused.  Non-toxic appearance. She does not have a sickly appearance. She does not appear ill. No distress.   HENT:   Head: Normocephalic and atraumatic.   Eyes: Conjunctivae and EOM are normal.   Neck: Normal range of motion. Neck supple.   Cardiovascular: Normal rate, regular rhythm and normal heart sounds.   Pulmonary/Chest: Effort normal and breath sounds normal.   Abdominal: Soft. Normal appearance and bowel sounds are normal. She exhibits no distension. There is no tenderness. There is no rigidity, no rebound and no guarding.   Neurological: She is alert, oriented to person, place, and time and easily aroused. She has normal strength. GCS eye subscore is 4. GCS verbal subscore is 5. GCS motor subscore is 6.   Skin: Skin is warm, dry and intact. Capillary refill takes less than 2 seconds. No bruising and no rash noted. No pallor.   Psychiatric: She has a normal mood and affect. Her speech is normal and behavior is normal. Judgment and thought content normal. Cognition and memory are normal.         ED Course   Procedures  Labs Reviewed   CBC W/ AUTO DIFFERENTIAL - Abnormal; Notable for the following components:       Result Value    RBC 3.96 (*)     Hemoglobin 11.5 (*)     Hematocrit 35.2 (*)     RDW 15.1 (*)     Platelets 370 (*)     All other components within normal limits   COMPREHENSIVE METABOLIC PANEL - Abnormal; Notable for the following components:    Glucose 111 (*)     All other components within normal limits   URINALYSIS - Abnormal;  Notable for the following components:    Occult Blood UA 3+ (*)     All other components within normal limits   URINALYSIS MICROSCOPIC - Abnormal; Notable for the following components:    RBC, UA 30 (*)     All other components within normal limits   TSH   TSH   POCT URINE PREGNANCY          Imaging Results    None          Medical Decision Making:   Initial Assessment:   38-year-old female here for fatigue.  She reports that she has been having vaginal bleeding since 02/05/2019.  She is established with Gynecology.  No trauma or pain.  No anticoagulant use.  The patient appears well, nontoxic.  Abdomen soft, nontender.  She is not pale.  Differential Diagnosis:   DUB, anemia, dehydration, electrolyte derangement, infection, menstruation, pregnancy, drug effect  Clinical Tests:   Lab Tests: Ordered and Reviewed  ED Management:  Labs, IV fluids  The patient reports that she is feeling much better after IV fluids.  She is tolerating oral fluids without difficulty.  No indication for imaging or emergent consultation with OB at this time.  The patient's symptoms due to DUB.  Encouraged increased fluid intake and follow up with her PCP or gynecologist within 3-5 days.  I reviewed strict return precautions including worsening or change of pain, fever, chest pain, shortness of breath, lightheadedness, dizziness, or bleeding more than 1 pad an hour.  The patient verbalized understanding, compliance, and agreement with the treatment plan.                   ED Course as of Feb 28 1445   Thu Feb 28, 2019   1348 This is a SORT/MSE of a 38 y.o. female presenting to the ED with c/o fatigue.  Patient complained of intermittent vaginal bleeding since 2/15/19. Associates headache, dizziness, lightheadedness, and generalized weakness.  Denies syncope.  Care will be transferred to an alternate provider when patient is roomed for a full evaluation and final disposition. TOSHIA Combs 1:48 PM   [CH]      ED Course User Index  [CH]  Cristobal Padron, LINNEA     Clinical Impression:       ICD-10-CM ICD-9-CM   1. DUB (dysfunctional uterine bleeding) N93.8 626.8   2. Vaginal bleeding N93.9 623.8   3. Fatigue, unspecified type R53.83 780.79                                Stacy Cheng, [facility]  02/28/19 1612

## 2021-12-16 ENCOUNTER — HOSPITAL ENCOUNTER (EMERGENCY)
Facility: HOSPITAL | Age: 41
Discharge: HOME | End: 2021-12-16
Attending: EMERGENCY MEDICINE
Payer: MEDICAID

## 2021-12-16 VITALS
SYSTOLIC BLOOD PRESSURE: 134 MMHG | OXYGEN SATURATION: 100 % | DIASTOLIC BLOOD PRESSURE: 96 MMHG | RESPIRATION RATE: 20 BRPM | WEIGHT: 180 LBS | TEMPERATURE: 97 F | BODY MASS INDEX: 30.73 KG/M2 | HEART RATE: 70 BPM | HEIGHT: 64 IN

## 2021-12-16 DIAGNOSIS — K85.90 ACUTE PANCREATITIS, UNSPECIFIED COMPLICATION STATUS, UNSPECIFIED PANCREATITIS TYPE: Primary | ICD-10-CM

## 2021-12-16 LAB
ALBUMIN SERPL BCP-MCNC: 3.8 G/DL (ref 3.5–5.2)
ALP SERPL-CCNC: 77 U/L (ref 55–135)
ALT SERPL W/O P-5'-P-CCNC: 16 U/L (ref 10–44)
ANION GAP SERPL CALC-SCNC: 6 MMOL/L (ref 8–16)
AST SERPL-CCNC: 16 U/L (ref 10–40)
BACTERIA #/AREA URNS HPF: ABNORMAL /HPF
BASOPHILS # BLD AUTO: 0.02 K/UL (ref 0–0.2)
BASOPHILS NFR BLD: 0.2 % (ref 0–1.9)
BILIRUB SERPL-MCNC: 0.2 MG/DL (ref 0.1–1)
BILIRUB UR QL STRIP: NEGATIVE
BUN SERPL-MCNC: 12 MG/DL (ref 6–20)
CALCIUM SERPL-MCNC: 9.3 MG/DL (ref 8.7–10.5)
CHLORIDE SERPL-SCNC: 108 MMOL/L (ref 95–110)
CLARITY UR: ABNORMAL
CO2 SERPL-SCNC: 27 MMOL/L (ref 23–29)
COLOR UR: YELLOW
CREAT SERPL-MCNC: 0.7 MG/DL (ref 0.5–1.4)
CTP QC/QA: YES
DIFFERENTIAL METHOD: ABNORMAL
EOSINOPHIL # BLD AUTO: 0.1 K/UL (ref 0–0.5)
EOSINOPHIL NFR BLD: 0.9 % (ref 0–8)
ERYTHROCYTE [DISTWIDTH] IN BLOOD BY AUTOMATED COUNT: 15.7 % (ref 11.5–14.5)
EST. GFR  (AFRICAN AMERICAN): >60 ML/MIN/1.73 M^2
EST. GFR  (NON AFRICAN AMERICAN): >60 ML/MIN/1.73 M^2
GLUCOSE SERPL-MCNC: 86 MG/DL (ref 70–110)
GLUCOSE UR QL STRIP: ABNORMAL
HCT VFR BLD AUTO: 36.4 % (ref 37–48.5)
HGB BLD-MCNC: 11.5 G/DL (ref 12–16)
HGB UR QL STRIP: NEGATIVE
IMM GRANULOCYTES # BLD AUTO: 0.02 K/UL (ref 0–0.04)
IMM GRANULOCYTES NFR BLD AUTO: 0.2 % (ref 0–0.5)
KETONES UR QL STRIP: ABNORMAL
LEUKOCYTE ESTERASE UR QL STRIP: NEGATIVE
LIPASE SERPL-CCNC: 104 U/L (ref 4–60)
LYMPHOCYTES # BLD AUTO: 3.3 K/UL (ref 1–4.8)
LYMPHOCYTES NFR BLD: 38.9 % (ref 18–48)
MCH RBC QN AUTO: 27.2 PG (ref 27–31)
MCHC RBC AUTO-ENTMCNC: 31.6 G/DL (ref 32–36)
MCV RBC AUTO: 86 FL (ref 82–98)
MICROSCOPIC COMMENT: ABNORMAL
MONOCYTES # BLD AUTO: 0.5 K/UL (ref 0.3–1)
MONOCYTES NFR BLD: 5.7 % (ref 4–15)
NEUTROPHILS # BLD AUTO: 4.6 K/UL (ref 1.8–7.7)
NEUTROPHILS NFR BLD: 54.1 % (ref 38–73)
NITRITE UR QL STRIP: NEGATIVE
NRBC BLD-RTO: 0 /100 WBC
PH UR STRIP: 7 [PH] (ref 5–8)
PLATELET # BLD AUTO: 334 K/UL (ref 150–450)
PMV BLD AUTO: 10.1 FL (ref 9.2–12.9)
POTASSIUM SERPL-SCNC: 3.9 MMOL/L (ref 3.5–5.1)
PROT SERPL-MCNC: 7.3 G/DL (ref 6–8.4)
PROT UR QL STRIP: ABNORMAL
RBC # BLD AUTO: 4.23 M/UL (ref 4–5.4)
RBC #/AREA URNS HPF: 4 /HPF (ref 0–4)
SARS-COV-2 RDRP RESP QL NAA+PROBE: NEGATIVE
SODIUM SERPL-SCNC: 141 MMOL/L (ref 136–145)
SP GR UR STRIP: >1.03 (ref 1–1.03)
SQUAMOUS #/AREA URNS HPF: 20 /HPF
URN SPEC COLLECT METH UR: ABNORMAL
UROBILINOGEN UR STRIP-ACNC: NEGATIVE EU/DL
WBC # BLD AUTO: 8.58 K/UL (ref 3.9–12.7)
WBC #/AREA URNS HPF: 2 /HPF (ref 0–5)
YEAST URNS QL MICRO: ABNORMAL

## 2021-12-16 PROCEDURE — 96360 HYDRATION IV INFUSION INIT: CPT

## 2021-12-16 PROCEDURE — 99284 EMERGENCY DEPT VISIT MOD MDM: CPT | Mod: 25

## 2021-12-16 PROCEDURE — U0002 COVID-19 LAB TEST NON-CDC: HCPCS | Performed by: PHYSICIAN ASSISTANT

## 2021-12-16 PROCEDURE — 85025 COMPLETE CBC W/AUTO DIFF WBC: CPT | Performed by: PHYSICIAN ASSISTANT

## 2021-12-16 PROCEDURE — 80053 COMPREHEN METABOLIC PANEL: CPT | Performed by: PHYSICIAN ASSISTANT

## 2021-12-16 PROCEDURE — 25000003 PHARM REV CODE 250: Performed by: EMERGENCY MEDICINE

## 2021-12-16 PROCEDURE — 96361 HYDRATE IV INFUSION ADD-ON: CPT

## 2021-12-16 PROCEDURE — 83690 ASSAY OF LIPASE: CPT | Performed by: PHYSICIAN ASSISTANT

## 2021-12-16 PROCEDURE — 81000 URINALYSIS NONAUTO W/SCOPE: CPT | Performed by: PHYSICIAN ASSISTANT

## 2021-12-16 RX ADMIN — SODIUM CHLORIDE 1000 ML: 0.9 INJECTION, SOLUTION INTRAVENOUS at 12:12

## 2021-12-16 RX ADMIN — SODIUM CHLORIDE 1000 ML: 0.9 INJECTION, SOLUTION INTRAVENOUS at 02:12

## 2022-07-09 ENCOUNTER — HOSPITAL ENCOUNTER (EMERGENCY)
Facility: HOSPITAL | Age: 42
Discharge: HOME | End: 2022-07-09
Attending: EMERGENCY MEDICINE
Payer: MEDICAID

## 2022-07-09 VITALS
HEART RATE: 71 BPM | RESPIRATION RATE: 18 BRPM | DIASTOLIC BLOOD PRESSURE: 68 MMHG | SYSTOLIC BLOOD PRESSURE: 134 MMHG | OXYGEN SATURATION: 99 % | TEMPERATURE: 99 F

## 2022-07-09 DIAGNOSIS — R79.89 ELEVATED LFTS: ICD-10-CM

## 2022-07-09 DIAGNOSIS — D64.9 ANEMIA, UNSPECIFIED TYPE: ICD-10-CM

## 2022-07-09 DIAGNOSIS — U07.1 COVID-19: Primary | ICD-10-CM

## 2022-07-09 LAB
ALBUMIN SERPL BCP-MCNC: 3.3 G/DL (ref 3.5–5.2)
ALP SERPL-CCNC: 65 U/L (ref 55–135)
ALT SERPL W/O P-5'-P-CCNC: 54 U/L (ref 10–44)
ANION GAP SERPL CALC-SCNC: 10 MMOL/L (ref 8–16)
AST SERPL-CCNC: 56 U/L (ref 10–40)
BASOPHILS # BLD AUTO: 0.02 K/UL (ref 0–0.2)
BASOPHILS NFR BLD: 0.6 % (ref 0–1.9)
BILIRUB SERPL-MCNC: 0.2 MG/DL (ref 0.1–1)
BILIRUB UR QL STRIP: NEGATIVE
BUN SERPL-MCNC: 5 MG/DL (ref 6–20)
CALCIUM SERPL-MCNC: 8.5 MG/DL (ref 8.7–10.5)
CHLORIDE SERPL-SCNC: 104 MMOL/L (ref 95–110)
CLARITY UR REFRACT.AUTO: ABNORMAL
CO2 SERPL-SCNC: 22 MMOL/L (ref 23–29)
COLOR UR AUTO: YELLOW
CREAT SERPL-MCNC: 0.7 MG/DL (ref 0.5–1.4)
CTP QC/QA: YES
DIFFERENTIAL METHOD: ABNORMAL
EOSINOPHIL # BLD AUTO: 0 K/UL (ref 0–0.5)
EOSINOPHIL NFR BLD: 0.3 % (ref 0–8)
ERYTHROCYTE [DISTWIDTH] IN BLOOD BY AUTOMATED COUNT: 15.6 % (ref 11.5–14.5)
EST. GFR  (AFRICAN AMERICAN): >60 ML/MIN/1.73 M^2
EST. GFR  (NON AFRICAN AMERICAN): >60 ML/MIN/1.73 M^2
GLUCOSE SERPL-MCNC: 97 MG/DL (ref 70–110)
GLUCOSE UR QL STRIP: NEGATIVE
HCT VFR BLD AUTO: 35.7 % (ref 37–48.5)
HGB BLD-MCNC: 11.6 G/DL (ref 12–16)
HGB UR QL STRIP: NEGATIVE
IMM GRANULOCYTES # BLD AUTO: 0.01 K/UL (ref 0–0.04)
IMM GRANULOCYTES NFR BLD AUTO: 0.3 % (ref 0–0.5)
KETONES UR QL STRIP: NEGATIVE
LEUKOCYTE ESTERASE UR QL STRIP: NEGATIVE
LIPASE SERPL-CCNC: 52 U/L (ref 4–60)
LYMPHOCYTES # BLD AUTO: 1.4 K/UL (ref 1–4.8)
LYMPHOCYTES NFR BLD: 39 % (ref 18–48)
MCH RBC QN AUTO: 27.6 PG (ref 27–31)
MCHC RBC AUTO-ENTMCNC: 32.5 G/DL (ref 32–36)
MCV RBC AUTO: 85 FL (ref 82–98)
MONOCYTES # BLD AUTO: 0.3 K/UL (ref 0.3–1)
MONOCYTES NFR BLD: 7.7 % (ref 4–15)
NEUTROPHILS # BLD AUTO: 1.8 K/UL (ref 1.8–7.7)
NEUTROPHILS NFR BLD: 52.1 % (ref 38–73)
NITRITE UR QL STRIP: NEGATIVE
NRBC BLD-RTO: 0 /100 WBC
PH UR STRIP: 5 [PH] (ref 5–8)
PLATELET # BLD AUTO: 216 K/UL (ref 150–450)
PMV BLD AUTO: 10.2 FL (ref 9.2–12.9)
POTASSIUM SERPL-SCNC: 4.1 MMOL/L (ref 3.5–5.1)
PROT SERPL-MCNC: 7 G/DL (ref 6–8.4)
PROT UR QL STRIP: NEGATIVE
RBC # BLD AUTO: 4.21 M/UL (ref 4–5.4)
SARS-COV-2 RDRP RESP QL NAA+PROBE: POSITIVE
SODIUM SERPL-SCNC: 136 MMOL/L (ref 136–145)
SP GR UR STRIP: 1.02 (ref 1–1.03)
URN SPEC COLLECT METH UR: ABNORMAL
WBC # BLD AUTO: 3.49 K/UL (ref 3.9–12.7)

## 2022-07-09 PROCEDURE — 99284 PR EMERGENCY DEPT VISIT,LEVEL IV: ICD-10-PCS | Mod: CR,CS,, | Performed by: PHYSICIAN ASSISTANT

## 2022-07-09 PROCEDURE — 99284 EMERGENCY DEPT VISIT MOD MDM: CPT | Mod: CR,CS,, | Performed by: PHYSICIAN ASSISTANT

## 2022-07-09 PROCEDURE — 80053 COMPREHEN METABOLIC PANEL: CPT | Performed by: PHYSICIAN ASSISTANT

## 2022-07-09 PROCEDURE — 83690 ASSAY OF LIPASE: CPT | Performed by: PHYSICIAN ASSISTANT

## 2022-07-09 PROCEDURE — 96374 THER/PROPH/DIAG INJ IV PUSH: CPT

## 2022-07-09 PROCEDURE — 81003 URINALYSIS AUTO W/O SCOPE: CPT | Performed by: PHYSICIAN ASSISTANT

## 2022-07-09 PROCEDURE — 25000003 PHARM REV CODE 250: Performed by: PHYSICIAN ASSISTANT

## 2022-07-09 PROCEDURE — 63600175 PHARM REV CODE 636 W HCPCS: Performed by: PHYSICIAN ASSISTANT

## 2022-07-09 PROCEDURE — U0002 COVID-19 LAB TEST NON-CDC: HCPCS | Performed by: PHYSICIAN ASSISTANT

## 2022-07-09 PROCEDURE — 96375 TX/PRO/DX INJ NEW DRUG ADDON: CPT

## 2022-07-09 PROCEDURE — 99284 EMERGENCY DEPT VISIT MOD MDM: CPT | Mod: 25

## 2022-07-09 PROCEDURE — 85025 COMPLETE CBC W/AUTO DIFF WBC: CPT | Performed by: PHYSICIAN ASSISTANT

## 2022-07-09 RX ORDER — KETOROLAC TROMETHAMINE 30 MG/ML
15 INJECTION, SOLUTION INTRAMUSCULAR; INTRAVENOUS ONCE
Status: COMPLETED | OUTPATIENT
Start: 2022-07-09 | End: 2022-07-09

## 2022-07-09 RX ORDER — ONDANSETRON 2 MG/ML
4 INJECTION INTRAMUSCULAR; INTRAVENOUS
Status: COMPLETED | OUTPATIENT
Start: 2022-07-09 | End: 2022-07-09

## 2022-07-09 RX ORDER — ONDANSETRON 4 MG/1
4 TABLET, ORALLY DISINTEGRATING ORAL EVERY 6 HOURS PRN
Qty: 16 TABLET | Refills: 0 | Status: SHIPPED | OUTPATIENT
Start: 2022-07-09

## 2022-07-09 RX ORDER — IBUPROFEN 800 MG/1
800 TABLET ORAL EVERY 6 HOURS PRN
Qty: 20 TABLET | Refills: 0 | Status: SHIPPED | OUTPATIENT
Start: 2022-07-09

## 2022-07-09 RX ADMIN — SODIUM CHLORIDE 1000 ML: 0.9 INJECTION, SOLUTION INTRAVENOUS at 09:07

## 2022-07-09 RX ADMIN — KETOROLAC TROMETHAMINE 15 MG: 30 INJECTION, SOLUTION INTRAMUSCULAR at 09:07

## 2022-07-09 RX ADMIN — ONDANSETRON 4 MG: 2 INJECTION INTRAMUSCULAR; INTRAVENOUS at 09:07

## 2022-07-09 NOTE — Clinical Note
"Kimmie "Vesta Mi was seen and treated in our emergency department on 7/9/2022.     COVID-19 is present in our communities across the state. There is limited testing for COVID at this time, so not all patients can be tested. In this situation, your employee meets the following criteria:    Kimmie Mi has met the criteria for COVID-19 testing and has a POSITIVE result. She can return to work once they are asymptomatic for 24 hours without the use of fever reducing medications AND at least five days from the first positive result. A mask is recommended for 5 days post quarantine.     If you have any questions or concerns, or if I can be of further assistance, please do not hesitate to contact me.    Sincerely,             Fawn Mccauley PA-C"

## 2022-07-10 NOTE — ED TRIAGE NOTES
Pt. c cough, chills, nausea, and headache for 2 days. Pt. Spouse tested positive for covid.  No other s/s or complaints.      APPEARANCE: No acute distress.    NEURO: Awake, alert, appropriate for age  HEENT: Head symmetrical. No obvious deformity  RESPIRATORY: Airway is open and patent. Respirations are spontaneous on room air.   NEUROVASCULAR: All extremities are warm and pink with capillary refill less than 3 seconds.   MUSCULOSKELETAL: Moves all extremities, wiggling toes and moving hands.   SKIN: Warm and dry, adequate turgor, mucus membranes moist and pink  SOCIAL: Patient is accompanied by family.   Will continue to monitor.

## 2022-07-10 NOTE — ED PROVIDER NOTES
"Encounter Date: 7/9/2022       History     Chief Complaint   Patient presents with    Abdominal Pain     Upper abd pain, unsure how long it has been hurting, states "I had a pancreas scare in December" endorses nausea and diarrhea     8:50 PM  Patient is a 41-year-old female who presents the [facility] with multiple complaints.      Patient states earlier this week she had chills, myalgias, generalized headache.  She took a home COVID test that was negative.  Patient states 2 days ago she started to have generalized abdominal pain mainly in her epigastric region, nausea without vomiting, diarrhea.  She had about 5-6 episodes of loose, runny, nonbloody stools.  She states that her boyfriend tested positive for COVID on Thursday, 2 days ago.        Review of patient's allergies indicates:  No Known Allergies  Past Medical History:   Diagnosis Date    Depression      Past Surgical History:   Procedure Laterality Date    ANKLE SURGERY Left      History reviewed. No pertinent family history.  Social History     Tobacco Use    Smoking status: Never Smoker   Substance Use Topics    Alcohol use: No    Drug use: No     Review of Systems   Constitutional: Positive for chills. Negative for fever.   HENT: Negative for sore throat.    Respiratory: Negative for shortness of breath.    Cardiovascular: Negative for chest pain.   Gastrointestinal: Positive for abdominal pain, diarrhea, nausea and vomiting.   Genitourinary: Negative for dysuria.   Musculoskeletal: Positive for myalgias. Negative for back pain.   Skin: Negative for rash.   Neurological: Positive for headaches. Negative for weakness.   Hematological: Does not bruise/bleed easily.       Physical Exam     Initial Vitals [07/09/22 1947]   BP Pulse Resp Temp SpO2   134/68 71 18 98.7 °F (37.1 °C) 99 %      MAP       --         Physical Exam    Vitals reviewed.  Constitutional: She appears well-developed and well-nourished. She is not diaphoretic. She is cooperative.  " Non-toxic appearance. She does not have a sickly appearance. She does not appear ill. No distress. Face mask in place.   HENT:   Head: Normocephalic and atraumatic.   Nose: Nose normal.   Mouth/Throat: No trismus in the jaw.   Eyes: Conjunctivae and EOM are normal.   Neck:   Normal range of motion.  Pulmonary/Chest: No accessory muscle usage. No tachypnea. No respiratory distress.   Abdominal: She exhibits no distension. There is abdominal tenderness in the epigastric area.   Musculoskeletal:         General: Normal range of motion.      Cervical back: Normal range of motion.     Neurological: She is alert. She has normal strength.   Skin: Skin is warm and dry. No erythema. No pallor.         ED Course   Procedures  Labs Reviewed   CBC W/ AUTO DIFFERENTIAL - Abnormal; Notable for the following components:       Result Value    WBC 3.49 (*)     Hemoglobin 11.6 (*)     Hematocrit 35.7 (*)     RDW 15.6 (*)     All other components within normal limits   COMPREHENSIVE METABOLIC PANEL - Abnormal; Notable for the following components:    CO2 22 (*)     BUN 5 (*)     Calcium 8.5 (*)     Albumin 3.3 (*)     AST 56 (*)     ALT 54 (*)     All other components within normal limits   URINALYSIS, REFLEX TO URINE CULTURE - Abnormal; Notable for the following components:    Appearance, UA Hazy (*)     All other components within normal limits    Narrative:     Specimen Source->Urine   SARS-COV-2 RDRP GENE - Abnormal; Notable for the following components:    POC Rapid COVID Positive (*)     All other components within normal limits    Narrative:     This test utilizes isothermal nucleic acid amplification   technology to detect the SARS-CoV-2 RdRp nucleic acid segment.   The analytical sensitivity (limit of detection) is 125 genome   equivalents/mL.   A POSITIVE result implies infection with the SARS-CoV-2 virus;   the patient is presumed to be contagious.     A NEGATIVE result means that SARS-CoV-2 nucleic acids are not   present  above the limit of detection. A NEGATIVE result should be   treated as presumptive. It does not rule out the possibility of   COVID-19 and should not be the sole basis for treatment decisions.   If COVID-19 is strongly suspected based on clinical and exposure   history, re-testing using an alternate molecular assay should be   considered.   This test is only for use under the Food and Drug   Administration s Emergency Use Authorization (EUA).   Commercial kits are provided by [employer].   Performance characteristics of the EUA have been independently   verified by [facility] Department of   Pathology and Laboratory Medicine.   _________________________________________________________________   The authorized Fact Sheet for Healthcare Providers and the authorized Fact   Sheet for Patients of the ID NOW COVID-19 are available on the FDA   website:     https://www.fda.gov/media/563800/download  https://www.fda.gov/media/551778/download           LIPASE          Imaging Results    None          Medications   sodium chloride 0.9% bolus 1,000 mL (1,000 mLs Intravenous New Bag 7/9/22 2118)   ondansetron injection 4 mg (4 mg Intravenous Given 7/9/22 2114)   ketorolac injection 15 mg (15 mg Intravenous Given 7/9/22 2115)     Medical Decision Making:   Initial Assessment:   Patient is a 41-year-old female who [place] [facility] [place] with multiple complaints.    Differential Diagnosis:   Includes but is not limited to COVID-19, other viral syndrome, such as viral gastroenteritis, pancreatitis, gastritis, GERD, electrolyte abn.  Clinical Tests:   Lab Tests: Ordered and Reviewed  ED Management:  Will initiate workup, give medication for symptomatic relief, and reassess.             ED Course as of 07/10/22 1950   Sat Jul 09, 2022 2133 BP: 134/68 [CL]   2133 Temp: 98.7 °F (37.1 °C) [CL]   2133 Pulse: 71 [CL]   2133 Resp: 18 [CL]   2133 SpO2: 99 % [CL]   2133 SARS-CoV-2 RNA, Amplification, Qual(!): Positive [CL]    2133 WBC(!): 3.49 [CL]   2133 Hemoglobin(!): 11.6 [CL]   2134 Urinalysis, Reflex to Urine Culture Urine, Clean Catch(!)  No blood or infection. [CL]   2149 Sodium: 136 [CL]   2149 Potassium: 4.1 [CL]   2149 Creatinine: 0.7 [CL]   2149 BILIRUBIN TOTAL: 0.2  No signs of bile duct obstruction. [CL]   2149 AST(!): 56 [CL]   2149 ALT(!): 54 [CL]   2149 Lipase: 52  No signs of pancreatitis.  [CL]      ED Course User Index  [CL] Fawn Mccauley PA-C           Patient tested positive for COVID-19 which likely explains her symptoms.  She feels improved after Toradol here.  Advised to continue OTC medication for symptomatic relief.  Rest.  Stay hydrated.  Quarantine.  The remainder of her labs are within normal limits, noting very mild transaminitis in the 50s (upper limites of normal is 40s).  I doubt pancreatitis, biliary duct obstruction, or any life-threatening conditions.  Follow up closely.  Return to ED precautions were given.  She is stable for discharge.    Clinical Impression:   Final diagnoses:  [U07.1] COVID-19 (Primary)  [D64.9] Anemia, unspecified type  [R79.89] Elevated LFTs          ED Disposition Condition    Discharge Stable        ED Prescriptions     Medication Sig Dispense Start Date End Date Auth. Provider    ibuprofen (ADVIL,MOTRIN) 800 MG tablet Take 1 tablet (800 mg total) by mouth every 6 (six) hours as needed for Pain. 20 tablet 7/9/2022  Fawn Mccauley PA-C    ondansetron (ZOFRAN-ODT) 4 MG TbDL Take 1 tablet (4 mg total) by mouth every 6 (six) hours as needed (nausea). 16 tablet 7/9/2022  Fawn Mccauley PA-C        Follow-up Information     Follow up With Specialties Details Why Contact Info    Nancy Ramey MD Family Medicine Schedule an appointment as soon as possible for a visit   [address] [facility]  [address]  [place] [address] [phone]      [facility] - Emergency Dept Emergency Medicine  If symptoms worsen [address] [facility] [address] [phone]             Fawn  YADIRA Mccauley  07/10/22 [place]

## 2022-07-10 NOTE — DISCHARGE INSTRUCTIONS
Monitor your symptoms.  Take over-the-counter medication for symptoms such as decongestants, cough suppressants, sore throat medication, etc.  Take acetaminophen/Tylenol 650 mg every 6 hr for pain relief for fever reduction.  You can take ibuprofen 600 mg every 6 hr for additional relief.  Take zofran for nausea every 6 hours.   Take over the counter immodium for your diarrhea.   Stay hydrated. Eat small meals throughout the day.  Rest.  Stay hydrated.  Continue to social isolate.  Quarantine for 5 days after the 1st day of symptom onset or 72 hr after your last fever without fever reducing medication. You will need to wear you mask for an additional 5 days after that.  If you developed shortness of breath, please  a pulse oximeter that is over-the-counter.  Return to the ER or follow up if you oxygen saturations of 93% or below on room air.   Return to the ED for any concerning signs or symptoms.  No future appointments.

## 2022-10-06 NOTE — ED AVS SNAPSHOT
[facility]  [address] [place] [address]               Kimmie Mi   3/14/2017  8:25 PM   ED    Description:  Female : 1980   Department:  [facility]           Your Care was Coordinated By:     Provider Role From To    Caron Villalta MD Attending Provider 17 --      Reason for Visit     Ankle Pain           Diagnoses this Visit        Comments    Ankle fracture, left, closed, initial encounter    -  Primary     Fall         Fracture           ED Disposition     ED Disposition Condition Comment    Discharge             To Do List           Follow-up Information     Follow up with Nancy Ramey MD In 1 week(s).    Specialty:  Family Medicine    Contact information:    [address] [place] [id number]  [phone]         These Medications        Disp Refills Start End    hydrocodone-acetaminophen 5-325mg (NORCO) 5-325 mg per tablet 20 tablet 0 3/14/2017     Take 1 tablet by mouth every 6 (six) hours as needed for Pain. - Oral      [facility]     [facility] -  Assistance  Registered nurses in the [facility] provide clinical advisement, health education, appointment booking, and other advisory services.  Call for this free service at [phone].             Medications           Message regarding Medications     Verify the changes and/or additions to your medication regime listed below are the same as discussed with your clinician today.  If any of these changes or additions are incorrect, please notify your healthcare provider.        START taking these NEW medications        Refills    hydrocodone-acetaminophen 5-325mg (NORCO) 5-325 mg per tablet 0    Sig: Take 1 tablet by mouth every 6 (six) hours as needed for Pain.    Class: Print    Route: Oral      These medications were administered today        Dose Freq    hydrocodone-acetaminophen 10-325mg per tablet 1 tablet 1  "tablet ED 1 Time    Sig: Take 1 tablet by mouth ED 1 Time.    Class: Normal    Route: Oral    morphine injection 10 mg 10 mg ED 1 Time    Sig: Inject 1 mL (10 mg total) into the muscle ED 1 Time.    Class: Normal    Route: Intramuscular    midazolam injection 2 mg 2 mg ED 1 Time    Sig: Inject 2 mLs (2 mg total) into the muscle ED 1 Time.    Class: Normal    Route: Intramuscular           Verify that the below list of medications is an accurate representation of the medications you are currently taking.  If none reported, the list may be blank. If incorrect, please contact your healthcare provider. Carry this list with you in case of emergency.           Current Medications     ibuprofen (ADVIL,MOTRIN) 800 MG tablet Take 1 tablet (800 mg total) by mouth every 6 (six) hours as needed for Pain.    paroxetine (PAXIL) 20 MG tablet Take 20 mg by mouth every morning.    hydrocodone-acetaminophen 5-325mg (NORCO) 5-325 mg per tablet Take 1 tablet by mouth every 6 (six) hours as needed for Pain.           Clinical Reference Information           Your Vitals Were     BP Pulse Temp Resp Height Weight    146/78 68 97.6 °F (36.4 °C) (Oral) 18 5' 3" (1.6 m) 85.7 kg (189 lb)    Last Period SpO2 BMI          03/13/2017 99% 33.48 kg/m2        Allergies as of 3/14/2017     No Known Allergies      Immunizations Administered on Date of Encounter - 3/14/2017     None      ED Micro, Lab, POCT     None      ED Imaging Orders     Start Ordered       Status Ordering Provider    03/14/17 2309 03/14/17 2243  X-Ray Ankle 2 View Left  1 time imaging      Final result     03/14/17 2243 03/14/17 2243    1 time imaging,   Status:  Canceled      Canceled     03/14/17 2028 03/14/17 2028  X-Ray Ankle Complete Left  1 time imaging      Final result         Discharge Instructions           Ankle Fracture    You have an ankle fracture. This means that one or more of the bones that make up the ankle joint are broken. This causes pain, swelling, and " sometimes bruising.  A fracture is treated with a splint or cast or special boot. It will take about 4 to 6 weeks for the fracture to heal. Surgery may be needed to fix severe injuries.  Home care  · You will be given a splint, cast or boot to prevent movement at the ankle joint. Unless you were told otherwise, use crutches or a walker. Dont weight on the injured leg until cleared by your healthcare provider to do so. Crutches and walkers can be rented at many pharmacies and surgical or orthopedic supply stores. Dont put weight on a splint. It will break.  · Keep your leg elevated to reduce pain and swelling. When sleeping, place a pillow under the injured leg. When sitting, support the injured leg so it is level with your waist. This is very important during the first 48 hours.  · Apply an ice pack over the injured area for no more than 15 to 20 minutes. Do this every 3 to 6 hours for the first 24 to 48 hours. Continue with ice packs 3 to 4 times a day for the next 2 days, then as needed to ease pain and swelling. To make an ice pack, put ice cubes in a plastic bag that seals at the top. Wrap the bag in a clean, thin towel or cloth. Never put ice or an ice pack directly on the skin. You can place the ice pack directly over the cast or splint. As the ice melts, be careful that the cast or splint doesnt get wet.  · Keep the cast, splint, or boot completely dry at all times. Bathe with your cast, splint, or boot out of the water, protected with 2 large plastic bags. Place 1 bag outside of the other. Tape each bag with duct tape at the top end. Water can still leak in. So it's best to keep the cast, splint, or boot away from water. If a boot or fiberglass cast or splint gets wet, dry it with a hair dryer on a cool setting.  · You may use over-the-counter pain medicine to control pain, unless another pain medicine was prescribed. Talk with your provider beforeusing these medicines if you have chronic liver or kidney  disease or ever had a stomach ulcer or GI bleeding.  Follow-up care  Follow up with your healthcare provider in 1 week, or as advised. This is to be sure the bone is healing properly. If you were given a splint, it may be changed to a cast at your follow-up visit.  If X-rays were taken, you will be told of any new findings that may affect your care.  When to seek medical advice  Call your healthcare provider right away if any of these occur:  · The plaster cast or splint becomes wet or soft  · The fiberglass cast or splint stays wet for more than 24 hours  · There is increased tightness, sore areas, or pain under the cast or splint  · Your toes become swollen, cold, blue, numb, or tingly  · The cast becomes loose  · The cast has a bad smell  · The cast develops cracks or breaks   Date Last Reviewed: 11/23/2015  © 2800-2547 [employer]. [address] [place] [address]. All rights reserved. This information is not intended as a substitute for professional medical care. Always follow your healthcare professional's instructions.          Treating Ankle Fractures  Treatment of an ankle fracture may be surgical or non-surgical, depending on where and how badly your ankle has been broken.   Some stable ankle fractures may be treated in a walking boot. These fractures are stable and will heal without additional treatment. You may be able to start walking on your ankle as soon as the pain improves.  Some fractures may require cast treatment.  A cast may be used to hold the broken bone in its proper position for healing. Sometimes the sections of broken bone must first be realigned. This is done by a process known as reduction. The type of reduction is based on how far the bone has moved from its normal position.     Sites of common ankle fractures    Closed reduction  If you have a clean break with little soft tissue damage, closed reduction will probably be used. Before the procedure, you may be  given a light anesthetic to relax your muscles. Then your doctor manually readjusts the position of the broken bone.  Open reduction  If you have an open fracture (bone sticking out through the skin), badly misaligned sections of bone, or severe tissue injury, open reduction is likely. A general anesthetic may be used during the procedure to let you sleep and relax your muscles. Your doctor then makes one or more incisions to realign the bone and repair soft tissue. Screws or plates may be used to hold the bone in place during healing.    Casting the fracture  To make sure the bone is aligned properly, an X-ray is taken. Then the ankle is put in a cast to hold the bone in place during healing. Youll probably have to wear the cast for several weeks. For less severe fractures, a walking boot, brace, or splint may be all thats needed to hold the bone in place during healing.  The road to healing  Once your fracture has been treated, your doctor will tell you how to help it heal. You may be told to limit ankle use or weight-bearing activities, take medicines, and elevate the foot. If you have a cast, remember to keep it dry.   Date Last Reviewed: 9/9/2015  © 7308-1183 [employer]. [address] [place] [address]. All rights reserved. This information is not intended as a substitute for professional medical care. Always follow your healthcare professional's instructions.          [facility] Sign-Up     Activating your [facility] account is as easy as 1-2-3!     1) Visit [email or web address], select Sign Up Now, enter this activation code and your date of birth, then select Next.  [email or web address]  Expires: 4/28/2017 11:26 PM      2) Create a username and password to use when you visit [facility] in the future and select a security question in case you lose your password and select Next.    3) Enter your e-mail address and click Sign Up!    Additional Information  If you have questions, please e-mail  [email or web address] or call [phone] to talk to our [facility] staff. Remember, [facility] is NOT to be used for urgent needs. For medical emergencies, dial 911.          [facility] complies with applicable Federal civil rights laws and does not discriminate on the basis of race, color, national origin, age, disability, or sex.        Language Assistance Services     ATTENTION: Language assistance services are available, free of charge. Please call [phone].      ATENCIÓN: Si habla español, tiene a polk disposición servicios gratuitos de asistencia lingüística. Llame al [phone].     CHÚ Ý: N?u b?n nói Ti?ng Vi?t, có các d?ch v? h? tr? ngôn ng? mi?n phí dành cho b?n. G?i s? [phone].         Other

## 2023-01-05 ENCOUNTER — HOSPITAL ENCOUNTER (EMERGENCY)
Facility: HOSPITAL | Age: 43
Discharge: LEFT BEFORE DISCHARGE | End: 2023-01-05
Payer: MEDICAID

## 2023-01-05 VITALS
TEMPERATURE: 99 F | WEIGHT: 180 LBS | SYSTOLIC BLOOD PRESSURE: 130 MMHG | OXYGEN SATURATION: 100 % | HEART RATE: 84 BPM | BODY MASS INDEX: 30.9 KG/M2 | RESPIRATION RATE: 16 BRPM | DIASTOLIC BLOOD PRESSURE: 84 MMHG

## 2023-01-05 LAB
GROUP A STREP, MOLECULAR: NEGATIVE
SARS-COV-2 RDRP RESP QL NAA+PROBE: NEGATIVE

## 2023-01-05 PROCEDURE — 99282 EMERGENCY DEPT VISIT SF MDM: CPT | Mod: 25

## 2023-01-05 PROCEDURE — 25000003 PHARM REV CODE 250: Performed by: PHYSICIAN ASSISTANT

## 2023-01-05 PROCEDURE — 87651 STREP A DNA AMP PROBE: CPT | Performed by: PHYSICIAN ASSISTANT

## 2023-01-05 PROCEDURE — U0002 COVID-19 LAB TEST NON-CDC: HCPCS | Performed by: EMERGENCY MEDICINE

## 2023-01-05 RX ORDER — ACETAMINOPHEN 500 MG
1000 TABLET ORAL
Status: COMPLETED | OUTPATIENT
Start: 2023-01-05 | End: 2023-01-05

## 2023-01-05 RX ADMIN — ACETAMINOPHEN 1000 MG: 500 TABLET ORAL at 04:01

## 2023-01-05 NOTE — FIRST PROVIDER EVALUATION
Emergency Department TeleTriage Encounter Note      CHIEF COMPLAINT    Chief Complaint   Patient presents with    Sore Throat     Sore throat for the past 2 weeks which has gotten increasingly worse and is now radiating into neck and causing her to have a headache. Denies trouble breathing or swallowing. Pt also reports decreased appetite, malaise and intermittent dizziness. Denies fever, denies N/V/D.       VITAL SIGNS   Initial Vitals [01/05/23 1525]   BP Pulse Resp Temp SpO2   130/84 84 16 99.2 °F (37.3 °C) 100 %      MAP       --            ALLERGIES    Review of patient's allergies indicates:  No Known Allergies    PROVIDER TRIAGE NOTE  Patient presents with 2 week history of sore throat, swollen tonsils and swollen glands. She has had headache and body aches for 2 days. She has not taken ay medications for her symptoms.       ORDERS  Labs Reviewed - No data to display    ED Orders (720h ago, onward)      None              Virtual Visit Note: The provider triage portion of this emergency department evaluation and documentation was performed via [employer], a HIPAA-compliant telemedicine application, in concert with a tele-presenter in the room. A face to face patient evaluation with one of my colleagues will occur once the patient is placed in an emergency department room.      DISCLAIMER: This note was prepared with [employer] voice recognition transcription software. Garbled syntax, mangled pronouns, and other bizarre constructions may be attributed to that software system.

## 2025-08-12 ENCOUNTER — HOSPITAL ENCOUNTER (EMERGENCY)
Facility: HOSPITAL | Age: 45
Discharge: HOME | End: 2025-08-12
Attending: STUDENT IN AN ORGANIZED HEALTH CARE EDUCATION/TRAINING PROGRAM
Payer: MEDICAID

## 2025-08-12 VITALS
TEMPERATURE: 98 F | HEIGHT: 64 IN | BODY MASS INDEX: 27.31 KG/M2 | OXYGEN SATURATION: 100 % | HEART RATE: 95 BPM | RESPIRATION RATE: 18 BRPM | WEIGHT: 160 LBS

## 2025-08-12 DIAGNOSIS — S89.90XA KNEE INJURY, INITIAL ENCOUNTER: ICD-10-CM
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
## 2025-08-12 PROCEDURE — 63600175 PHARM REV CODE 636 W HCPCS: Mod: ER

## 2025-08-12 PROCEDURE — 25000003 PHARM REV CODE 250: Mod: ER

## 2025-08-12 PROCEDURE — 90715 TDAP VACCINE 7 YRS/> IM: CPT | Mod: ER

## 2025-08-12 PROCEDURE — 90471 IMMUNIZATION ADMIN: CPT | Mod: ER

## 2025-08-12 PROCEDURE — 12041 INTMD RPR N-HF/GENIT 2.5CM/<: CPT | Mod: ER

## 2025-08-12 PROCEDURE — 99284 EMERGENCY DEPT VISIT MOD MDM: CPT | Mod: 25,ER

## 2025-08-12 RX ORDER — ACETAMINOPHEN 500 MG
1000 TABLET ORAL
Status: COMPLETED | OUTPATIENT
Start: 2025-08-12 | End: 2025-08-12

## 2025-08-12 RX ORDER — AMOXICILLIN AND CLAVULANATE POTASSIUM 875; 125 MG/1; MG/1
1 TABLET, FILM COATED ORAL 2 TIMES DAILY
Qty: 10 TABLET | Refills: 0 | Status: SHIPPED | OUTPATIENT
Start: 2025-08-12 | End: 2025-08-17

## 2025-08-12 RX ORDER — HYDROXYZINE HYDROCHLORIDE 50 MG/1
50 TABLET, FILM COATED ORAL 4 TIMES DAILY
COMMUNITY
End: 2025-08-12

## 2025-08-12 RX ORDER — DULOXETIN HYDROCHLORIDE 60 MG/1
60 CAPSULE, DELAYED RELEASE ORAL DAILY
COMMUNITY

## 2025-08-12 RX ORDER — LIDOCAINE HYDROCHLORIDE 10 MG/ML
10 INJECTION, SOLUTION EPIDURAL; INFILTRATION; INTRACAUDAL; PERINEURAL
Status: COMPLETED | OUTPATIENT
Start: 2025-08-12 | End: 2025-08-12

## 2025-08-12 RX ORDER — HYDROXYZINE HYDROCHLORIDE 50 MG/1
50 TABLET, FILM COATED ORAL 4 TIMES DAILY
COMMUNITY

## 2025-08-12 RX ADMIN — CLOSTRIDIUM TETANI TOXOID ANTIGEN (FORMALDEHYDE INACTIVATED), CORYNEBACTERIUM DIPHTHERIAE TOXOID ANTIGEN (FORMALDEHYDE INACTIVATED), BORDETELLA PERTUSSIS TOXOID ANTIGEN (GLUTARALDEHYDE INACTIVATED), BORDETELLA PERTUSSIS FILAMENTOUS HEMAGGLUTININ ANTIGEN (FORMALDEHYDE INACTIVATED), BORDETELLA PERTUSSIS PERTACTIN ANTIGEN, AND BORDETELLA PERTUSSIS FIMBRIAE 2/3 ANTIGEN 0.5 ML: 5; 2; 2.5; 5; 3; 5 INJECTION, SUSPENSION INTRAMUSCULAR at 10:08

## 2025-08-12 RX ADMIN — ACETAMINOPHEN 1000 MG: 500 TABLET ORAL at 10:08

## 2025-08-12 RX ADMIN — LIDOCAINE HYDROCHLORIDE 100 MG: 10 SOLUTION INTRAVENOUS at 10:08

## 2025-08-29 ENCOUNTER — TELEPHONE (OUTPATIENT)
Dept: ORTHOPEDICS | Facility: CLINIC | Age: 45
End: 2025-08-29
Payer: MEDICAID

## 2025-09-02 ENCOUNTER — OFFICE VISIT (OUTPATIENT)
Dept: ORTHOPEDICS | Facility: CLINIC | Age: 45
End: 2025-09-02
Payer: MEDICAID

## 2025-09-02 VITALS — BODY MASS INDEX: 27.33 KG/M2 | HEIGHT: 64 IN | WEIGHT: 160.06 LBS

## 2025-09-02 DIAGNOSIS — S83.411A SPRAIN OF MEDIAL COLLATERAL LIGAMENT OF RIGHT KNEE, INITIAL ENCOUNTER: Primary | ICD-10-CM

## 2025-09-02 PROCEDURE — 99999 PR PBB SHADOW E&M-EST. PATIENT-LVL III: CPT | Mod: PBBFAC,,, | Performed by: ORTHOPAEDIC SURGERY

## 2025-09-02 PROCEDURE — 99213 OFFICE O/P EST LOW 20 MIN: CPT | Mod: PBBFAC,PN | Performed by: ORTHOPAEDIC SURGERY

## 2025-09-02 PROCEDURE — 99203 OFFICE O/P NEW LOW 30 MIN: CPT | Mod: S$PBB,,, | Performed by: ORTHOPAEDIC SURGERY

## 2025-09-02 PROCEDURE — 3008F BODY MASS INDEX DOCD: CPT | Mod: CPTII,,, | Performed by: ORTHOPAEDIC SURGERY

## 2025-09-02 PROCEDURE — 1159F MED LIST DOCD IN RCRD: CPT | Mod: CPTII,,, | Performed by: ORTHOPAEDIC SURGERY
